# Patient Record
Sex: FEMALE | Race: AMERICAN INDIAN OR ALASKA NATIVE
[De-identification: names, ages, dates, MRNs, and addresses within clinical notes are randomized per-mention and may not be internally consistent; named-entity substitution may affect disease eponyms.]

---

## 2020-03-18 ENCOUNTER — HOSPITAL ENCOUNTER (EMERGENCY)
Dept: HOSPITAL 5 - ED | Age: 25
Discharge: LEFT BEFORE DISCHARGE | End: 2020-03-18
Payer: COMMERCIAL

## 2020-03-18 VITALS — SYSTOLIC BLOOD PRESSURE: 132 MMHG | DIASTOLIC BLOOD PRESSURE: 80 MMHG

## 2020-03-18 DIAGNOSIS — Z53.21: ICD-10-CM

## 2020-03-18 DIAGNOSIS — R50.9: Primary | ICD-10-CM

## 2020-03-18 NOTE — EVENT NOTE
ED Screening Note


Date of service: 03/18/20


Time: 13:21


ED Screening Note: 


24 y o female presents with feelings of weakness started while she was at work 

today at about 11 am


states she feels not herself and wanted to come get evaluated and tested


LMP: 2/24/2020


no sick contact, stating she wants to get tested for COVID- 19


She denies fever,chills, nausea, vomitting,abd pain. cp.sob. generalized pain, 

dizziness or any other symptoms








Initial orders include: 


 Follow up with PCP


 Home rest and hydrate and continue eating normslly


exam normal


Vital signs normal


No medical emergency

## 2020-06-10 ENCOUNTER — HOSPITAL ENCOUNTER (EMERGENCY)
Dept: HOSPITAL 5 - ED | Age: 25
Discharge: HOME | End: 2020-06-10
Payer: COMMERCIAL

## 2020-06-10 VITALS — DIASTOLIC BLOOD PRESSURE: 86 MMHG | SYSTOLIC BLOOD PRESSURE: 134 MMHG

## 2020-06-10 DIAGNOSIS — R07.89: Primary | ICD-10-CM

## 2020-06-10 DIAGNOSIS — F41.9: ICD-10-CM

## 2020-06-10 PROCEDURE — 93005 ELECTROCARDIOGRAM TRACING: CPT

## 2020-06-10 PROCEDURE — 99283 EMERGENCY DEPT VISIT LOW MDM: CPT

## 2020-06-10 PROCEDURE — 71045 X-RAY EXAM CHEST 1 VIEW: CPT

## 2020-06-10 NOTE — XRAY REPORT
CHEST 1 VIEW 6/10/2020 6:31 AM



INDICATION / CLINICAL INFORMATION:

Chest Pain.



COMPARISON: 

None available.



FINDINGS:



SUPPORT DEVICES: None.

HEART / MEDIASTINUM: No significant abnormality. 

LUNGS / PLEURA: No significant pulmonary or pleural abnormality. No pneumothorax. 



ADDITIONAL FINDINGS: No significant additional findings.



IMPRESSION:

1. No acute abnormality of the chest.



Signer Name: Skyler Loyola MD 

Signed: 6/10/2020 6:37 AM

Workstation Name: [id number]

## 2020-06-10 NOTE — EMERGENCY DEPARTMENT REPORT
ED General Adult HPI





- General


Chief complaint: Chest Pain


Stated complaint: CHEST PAIN


PUI?: No


Source: patient


Mode of arrival: Ambulatory


Limitations: No Limitations





- History of Present Illness


Initial comments: 





24-year-old -American female presents to the emergency room for chest 

pain that started approximate 1 AM this morning.  Patient states that she was 

unable to sleep due to the pain.  Patient reports that the pain is throbbing and

burning.  Patient denies any shortness of breath nausea vomiting.  Patient 

states that it is worse when she lies down.  Patient denies any radiation of 

pain denies any history of cardiac problems.  She denies any leg swelling.  She 

does report that her anxiety has increased.  She reports she is takes nothing 

for anxiety.  Patient reports that she had did a telehealth visit with Dr. Phong Garcia  doctor and they recommend for her to take melatonin and to follow-up if

she continues to have this issue..  Patient did not want to hurt herself or 

anyone else.





- Related Data


                                  Previous Rx's











 Medication  Instructions  Recorded  Last Taken  Type


 


hydrOXYzine HCL [Atarax] 25 mg PO BID PRN 5 Days #10 tablet 06/10/20 Unknown Rx











                                    Allergies











Allergy/AdvReac Type Severity Reaction Status Date / Time


 


No Known Allergies Allergy   Verified 06/10/20 06:08














ED Review of Systems


ROS: 


Stated complaint: CHEST PAIN


Other details as noted in HPI








ED Past Medical Hx





- Past Medical History


Previous Medical History?: Yes


Hx Psychiatric Treatment: Yes (anxiety)





- Surgical History


Past Surgical History?: No





- Social History


Smoking Status: Never Smoker


Substance Use Type: None





- Medications


Home Medications: 


                                Home Medications











 Medication  Instructions  Recorded  Confirmed  Last Taken  Type


 


hydrOXYzine HCL [Atarax] 25 mg PO BID PRN 5 Days #10 tablet 06/10/20  Unknown Rx














ED Physical Exam





- General


Limitations: No Limitations


General appearance: alert, in no apparent distress





- Head


Head exam: Present: atraumatic, normocephalic





- Eye


Eye exam: Present: normal appearance





- ENT


ENT exam: Present: mucous membranes moist





- Neck


Neck exam: Present: normal inspection





- Respiratory


Respiratory exam: Present: normal lung sounds bilaterally, chest wall 

tenderness.  Absent: respiratory distress





- Cardiovascular


Cardiovascular Exam: Present: regular rate, normal rhythm.  Absent: systolic 

murmur, diastolic murmur, rubs, gallop





- GI/Abdominal


GI/Abdominal exam: Present: soft, normal bowel sounds.  Absent: tenderness





- Extremities Exam


Extremities exam: Present: normal inspection, full ROM.  Absent: tenderness, 

pedal edema





- Back Exam


Back exam: Present: normal inspection





- Neurological Exam


Neurological exam: Present: alert, oriented X3, normal gait





- Psychiatric


Psychiatric exam: Present: normal affect, normal mood





- Skin


Skin exam: Present: warm, dry, intact, normal color.  Absent: rash





ED Medical Decision Making





- Radiology Data


Radiology results: report reviewed





Referring Physician:LISSETTE PRICEPatient Name:DEEPAK WOLFFatient 

ID:[id number] of Birth:[id number]:FemaleAccession:[id number] 

Date:[id number] Status:Finalized


Findings





[facility] 


[address] [place] [address] 





XRay Report 


Signed 





 Patient: DEEPAK POWELL MR#: 


 [id number] 


 : 1995 Acct:[id number] 





 Age/Sex: 24 / F ADM Date: 06/10/20 





 Loc: ED 


 Attending Dr: 








 Ordering Physician: LISSETTE PRICE MD 


 Date of Service: 06/10/20 


 Procedure(s): XR chest 1V ap 


 Accession Number(s): [id number] 





 cc: LISSETTE PRICE MD 





 Fluoro Time In Minutes: 





 CHEST 1 VIEW 6/10/2020 6:31 AM 





INDICATION / CLINICAL INFORMATION: 


Chest Pain. 





COMPARISON: 


None available. 





FINDINGS: 





SUPPORT DEVICES: None. 


HEART / MEDIASTINUM: No significant abnormality. 


LUNGS / PLEURA: No significant pulmonary or pleural abnormality. No 

pneumothorax. 





ADDITIONAL FINDINGS: No significant additional findings. 





IMPRESSION: 


1. No acute abnormality of the chest. 





Signer Name: Skyler Loyola MD 


Signed: 6/10/2020 6:37 AM 


Workstation Name: [id number] 








 Transcribed By: MN 


 Dictated By: Skyler Loyola MD 


 Electronically Authenticated By: Skyler Loyola MD 


 Signed Date/Time: 06/10/20 0637 











 DD/DT: 06/10/20 0637 


 TD/TT: 





- Medical Decision Making





4-year-old -American female presents to the emergency room for chest pain

 that started approximate 1 AM this morning.  Patient states that she was unable

 to sleep due to the pain.  Patient reports that the pain is throbbing and 

burning.  Patient denies any shortness of breath nausea vomiting.  Patient 

states that it is worse when she lies down.  Patient denies any radiation of 

pain denies any history of cardiac problems.  She denies any leg swelling.  She 

does report that her anxiety has increased.  She reports she is takes nothing 

for anxiety.  Patient reports that she had did a telehealth visit with Dr. Phong Garcia  doctor and they recommend for her to take melatonin and to follow-up 

if she continues to have this issue..  Patient did not want to hurt herself or 

anyone else.

















Chest x-ray shows no acute abnormalities, EKG shows normal sinus rhythm.  

Patient has chest wall tenderness.  We will treat her with ibuprofen and her 

anxiety recommend Atarax and she is to follow-up with a mental health provider.


Critical care attestation.: 


If time is entered above; I have spent that time in minutes in the direct care 

of this critically ill patient, excluding procedure time.








ED Disposition


Clinical Impression: 


 Chest wall tenderness, Anxiety, Chest discomfort





Disposition:  TO HOME OR SELFCARE


Is pt being admited?: No


Does the pt Need Aspirin: No


Condition: Stable


Instructions:  Chest Pain (ED), Costochondritis (ED)


Additional Instructions: 


Take ibuprofen or naproxen for chest wall tenderness.  Take Atarax for anxiety 

and to follow-up with a mental health provider.


Prescriptions: 


hydrOXYzine HCL [Atarax] 25 mg PO BID PRN 5 Days #10 tablet


 PRN Reason: Anxiety


Referrals: 


[facility] [Primary Care Provider] - 3-5 Days


[facility] [Outside] - 3-5 Days


[facility] [place] [Outside] - 3-5 Days


[facility] [Outside] - 3-5 Days

## 2020-10-10 ENCOUNTER — HOSPITAL ENCOUNTER (EMERGENCY)
Dept: HOSPITAL 5 - ED | Age: 25
Discharge: HOME | End: 2020-10-10
Payer: COMMERCIAL

## 2020-10-10 VITALS — DIASTOLIC BLOOD PRESSURE: 56 MMHG | SYSTOLIC BLOOD PRESSURE: 105 MMHG

## 2020-10-10 DIAGNOSIS — T78.40XA: Primary | ICD-10-CM

## 2020-10-10 DIAGNOSIS — F41.9: ICD-10-CM

## 2020-10-10 DIAGNOSIS — L50.9: ICD-10-CM

## 2020-10-10 DIAGNOSIS — F17.200: ICD-10-CM

## 2020-10-10 DIAGNOSIS — Z79.899: ICD-10-CM

## 2020-10-10 PROCEDURE — 96372 THER/PROPH/DIAG INJ SC/IM: CPT

## 2020-10-10 PROCEDURE — 99282 EMERGENCY DEPT VISIT SF MDM: CPT

## 2020-10-10 PROCEDURE — 99283 EMERGENCY DEPT VISIT LOW MDM: CPT

## 2020-10-10 NOTE — EMERGENCY DEPARTMENT REPORT
ED Allergic Reaction HPI





- General


Chief complaint: Allergic Reaction


Stated complaint: ALLERGIC REACTION


Source: patient


Mode of arrival: Ambulatory


Limitations: No Limitations





- History of Present Illness


Initial Comments: 





Patient is a 24-year-old -American female with a history of anxiety who 

presents to the ED with complaint of acute onset persistent intermittent diffuse

itchy erythematous urticarial rashes for the last 4 days.  Patient states that 

the symptoms began after she changed her hairstyle and suspects that she may 

have been exposed to certain chemicals or may be allergic to the hair that she 

used.  Patient states that she went and had the hair washed but that the itching

and rashes have been persistent despite using topical antihistamines at home.  

Patient denies swollen lips or tongue, dysphagia, dysphonia, hoarseness, 

shortness of breath, wheezing, cough, chest tightness or chest pain, facial 

swelling, nausea and vomiting or diarrhea and abdominal pain, nasal and sinus 

congestion, dizziness, fever and chills.


MD Complaint: allergic reaction, hives, other (diffuse itching)


-: Sudden, days(s) (4)


Exposure: unknown


Symptoms: rash, itching


Severity: severe


Treatment Prior to Arrival: topical medicine


Previous Allergy History: none





- Related Data


                                  Previous Rx's











 Medication  Instructions  Recorded  Last Taken  Type


 


hydrOXYzine HCL [Atarax] 25 mg PO BID PRN 5 Days #10 tablet 06/10/20 Unknown Rx


 


Famotidine [Pepcid] 20 mg PO BID #30 tablet 10/10/20 Unknown Rx


 


diphenhydrAMINE [Benadryl CAP] 25 mg PO Q6HR PRN #40 capsule 10/10/20 Unknown Rx


 


predniSONE [Deltasone] 60 mg PO QDAY #15 tab 10/10/20 Unknown Rx











                                    Allergies











Allergy/AdvReac Type Severity Reaction Status Date / Time


 


No Known Allergies Allergy   Verified 06/10/20 06:08














ED Review of Systems


ROS: 


Stated complaint: ALLERGIC REACTION


Other details as noted in HPI





Constitutional: denies: chills, fever


Eyes: denies: eye pain, eye discharge, vision change


ENT: denies: ear pain, throat pain


Respiratory: denies: cough, shortness of breath, wheezing


Cardiovascular: denies: chest pain, palpitations


Endocrine: no symptoms reported


Gastrointestinal: denies: abdominal pain, nausea, vomiting, diarrhea


Genitourinary: denies: urgency, dysuria, discharge


Musculoskeletal: denies: back pain, joint swelling, arthralgia


Skin: rash (Diffuse erythematous itchy maculopapular urticarial rashes), change 

in color, pruritus.  denies: lesions


Neurological: denies: headache, weakness, paresthesias


Psychiatric: denies: anxiety, depression


Hematological/Lymphatic: denies: easy bleeding, easy bruising





ED Past Medical Hx





- Past Medical History


Previous Medical History?: Yes


Hx Psychiatric Treatment: Yes (anxiety)





- Surgical History


Past Surgical History?: No





- Social History


Smoking Status: Current Every Day Smoker


Substance Use Type: Alcohol





- Medications


Home Medications: 


                                Home Medications











 Medication  Instructions  Recorded  Confirmed  Last Taken  Type


 


hydrOXYzine HCL [Atarax] 25 mg PO BID PRN 5 Days #10 tablet 06/10/20  Unknown Rx


 


Famotidine [Pepcid] 20 mg PO BID #30 tablet 10/10/20  Unknown Rx


 


diphenhydrAMINE [Benadryl CAP] 25 mg PO Q6HR PRN #40 capsule 10/10/20  Unknown 

Rx


 


predniSONE [Deltasone] 60 mg PO QDAY #15 tab 10/10/20  Unknown Rx














ED Physical Exam





- General


Limitations: No Limitations


General appearance: alert, in no apparent distress





- Head


Head exam: Present: atraumatic, normocephalic, normal inspection





- Eye


Eye exam: Present: normal appearance, PERRL, EOMI


Pupils: Present: normal accommodation





- ENT


ENT exam: Present: normal exam, normal orophraynx, mucous membranes moist, TM's 

normal bilaterally, normal external ear exam





- Neck


Neck exam: Present: normal inspection, full ROM





- Respiratory


Respiratory exam: Present: normal lung sounds bilaterally.  Absent: respiratory 

distress, wheezes, rales, rhonchi, stridor, chest wall tenderness, accessory 

muscle use, decreased breath sounds, prolonged expiratory





- Cardiovascular


Cardiovascular Exam: Present: regular rate, normal rhythm, normal heart sounds. 

 Absent: systolic murmur, diastolic murmur, rubs, gallop





- GI/Abdominal


GI/Abdominal exam: Present: soft, normal bowel sounds.  Absent: tenderness, 

guarding, rebound, hyperactive bowel sounds, hypoactive bowel sounds, 

organomegaly





- Extremities Exam


Extremities exam: Present: normal inspection, full ROM, normal capillary refill





- Back Exam


Back exam: Present: normal inspection, full ROM.  Absent: tenderness, CVA tend

erness (R), CVA tenderness (L), muscle spasm, paraspinal tenderness, vertebral 

tenderness





- Neurological Exam


Neurological exam: Present: alert, oriented X3, CN II-XII intact, normal gait, 

reflexes normal





- Psychiatric


Psychiatric exam: Present: normal affect, normal mood





- Skin


Skin exam: Present: warm, dry, intact, rash (Diffuse erythematous maculopapular 

urticarial rashes throughout), erythema, urticaria





ED Course





                                   Vital Signs











  10/10/20





  18:15


 


Temperature 97.8 F


 


Pulse Rate 71


 


Respiratory 14





Rate 


 


Blood Pressure 105/56


 


O2 Sat by Pulse 98





Oximetry 














ED Medical Decision Making





- Medical Decision Making





This is a 24-year-old -American female with a history of anxiety who 

presents to the ED with complaint of acute onset persistent intermittent diffuse

 itchy erythematous urticarial rashes for the last 4 days.  Patient states that 

the symptoms began after she changed her hairstyle and suspects that she may 

have been exposed to certain chemicals or may be allergic to the hair that she 

used.  Patient states that she went and had the hair washed but that the itching

 and rashes have been persistent despite using topical antihistamines at home.  

In the ED, patient is alert and oriented x3 and is not in distress.  Patient was

 treated in the ED for acute allergic reaction with Decadron, Claritin and 

Pepcid.  On reevaluation, patient's itching improved significantly and almost 

resolved.  Patient was discharged home on prescriptions of oral steroids, Pepcid

 and Vistaril and was advised to follow-up with her primary care physician in 5 

to 7 days for reevaluation or return to the ED immediately if symptoms get 

worse.





- Differential Diagnosis


Urticaria; allergic reaction; angioedema; irritant dermatitis; scabies


Critical care attestation.: 


If time is entered above; I have spent that time in minutes in the direct care 

of this critically ill patient, excluding procedure time.








ED Disposition


Clinical Impression: 


 Itching with irritation, Acute urticaria





Acute allergic reaction


Qualifiers:


 Encounter type: initial encounter Qualified Code(s): T78.40XA - Allergy, 

unspecified, initial encounter





Disposition: DC-01 TO HOME OR SELFCARE


Is pt being admited?: No


Does the pt Need Aspirin: No


Condition: Stable


Instructions:  Urticaria (ED), Allergies (ED), Itchy Skin (ED), Acute Rash (ED)


Additional Instructions: 


Take medication with food, drink plenty of fluids and follow-up with your 

primary care physician in 5 to 7 days for reevaluation.  Return to the ED 

immediately if your symptoms get worse.  Notes that some of the medications may 

make you drowsy so take extra precaution when taking these medications not to 

operate machinery or drive motor vehicle.


Prescriptions: 


diphenhydrAMINE [Benadryl CAP] 25 mg PO Q6HR PRN #40 capsule


 PRN Reason: Itching


predniSONE [Deltasone] 60 mg PO QDAY #15 tab


Famotidine [Pepcid] 20 mg PO BID #30 tablet


Referrals: 


[facility] [Provider Group] - 7-10 days


Time of Disposition: 19:39


Print Language: ENGLISH